# Patient Record
Sex: FEMALE | Race: BLACK OR AFRICAN AMERICAN | NOT HISPANIC OR LATINO | Employment: FULL TIME | URBAN - METROPOLITAN AREA
[De-identification: names, ages, dates, MRNs, and addresses within clinical notes are randomized per-mention and may not be internally consistent; named-entity substitution may affect disease eponyms.]

---

## 2018-05-10 ENCOUNTER — HOSPITAL ENCOUNTER (EMERGENCY)
Facility: HOSPITAL | Age: 28
Discharge: HOME | End: 2018-05-10
Attending: EMERGENCY MEDICINE
Payer: COMMERCIAL

## 2018-05-10 VITALS
RESPIRATION RATE: 12 BRPM | SYSTOLIC BLOOD PRESSURE: 137 MMHG | HEART RATE: 84 BPM | DIASTOLIC BLOOD PRESSURE: 90 MMHG | HEIGHT: 65 IN | OXYGEN SATURATION: 100 % | BODY MASS INDEX: 36.82 KG/M2 | TEMPERATURE: 99 F | WEIGHT: 221 LBS

## 2018-05-10 DIAGNOSIS — R03.0 ELEVATED BLOOD PRESSURE READING WITHOUT DIAGNOSIS OF HYPERTENSION: ICD-10-CM

## 2018-05-10 DIAGNOSIS — K80.50 BILIARY COLIC: ICD-10-CM

## 2018-05-10 DIAGNOSIS — K59.00 CONSTIPATION, UNSPECIFIED CONSTIPATION TYPE: ICD-10-CM

## 2018-05-10 DIAGNOSIS — R10.11 ABDOMINAL PAIN, RUQ (RIGHT UPPER QUADRANT): Primary | ICD-10-CM

## 2018-05-10 DIAGNOSIS — K80.20 CALCULUS OF GALLBLADDER WITHOUT CHOLECYSTITIS WITHOUT OBSTRUCTION: ICD-10-CM

## 2018-05-10 LAB
ALBUMIN SERPL BCP-MCNC: 3.6 G/DL
ALP SERPL-CCNC: 64 U/L
ALT SERPL W/O P-5'-P-CCNC: 18 U/L
AMYLASE SERPL-CCNC: 59 U/L
ANION GAP SERPL CALC-SCNC: 9 MMOL/L
AST SERPL-CCNC: 17 U/L
B-HCG UR QL: NEGATIVE
BACTERIA #/AREA URNS AUTO: NORMAL /HPF
BASOPHILS # BLD AUTO: 0.02 K/UL
BASOPHILS NFR BLD: 0.3 %
BILIRUB SERPL-MCNC: 0.2 MG/DL
BILIRUB UR QL STRIP: NEGATIVE
BUN SERPL-MCNC: 10 MG/DL
CALCIUM SERPL-MCNC: 9.5 MG/DL
CHLORIDE SERPL-SCNC: 104 MMOL/L
CLARITY UR REFRACT.AUTO: CLEAR
CO2 SERPL-SCNC: 25 MMOL/L
COLOR UR AUTO: YELLOW
CREAT SERPL-MCNC: 0.8 MG/DL
DIFFERENTIAL METHOD: ABNORMAL
EOSINOPHIL # BLD AUTO: 0.1 K/UL
EOSINOPHIL NFR BLD: 0.8 %
ERYTHROCYTE [DISTWIDTH] IN BLOOD BY AUTOMATED COUNT: 12.3 %
EST. GFR  (AFRICAN AMERICAN): >60 ML/MIN/1.73 M^2
EST. GFR  (NON AFRICAN AMERICAN): >60 ML/MIN/1.73 M^2
GLUCOSE SERPL-MCNC: 95 MG/DL
GLUCOSE UR QL STRIP: NEGATIVE
HCT VFR BLD AUTO: 36.9 %
HGB BLD-MCNC: 12.4 G/DL
HGB UR QL STRIP: ABNORMAL
KETONES UR QL STRIP: NEGATIVE
LEUKOCYTE ESTERASE UR QL STRIP: NEGATIVE
LIPASE SERPL-CCNC: 25 U/L
LYMPHOCYTES # BLD AUTO: 3.9 K/UL
LYMPHOCYTES NFR BLD: 51.4 %
MCH RBC QN AUTO: 29 PG
MCHC RBC AUTO-ENTMCNC: 33.6 G/DL
MCV RBC AUTO: 86 FL
MICROSCOPIC COMMENT: NORMAL
MONOCYTES # BLD AUTO: 0.6 K/UL
MONOCYTES NFR BLD: 7.6 %
NEUTROPHILS # BLD AUTO: 3 K/UL
NEUTROPHILS NFR BLD: 39.6 %
NITRITE UR QL STRIP: NEGATIVE
PH UR STRIP: 6 [PH] (ref 5–8)
PLATELET # BLD AUTO: 300 K/UL
PMV BLD AUTO: 10.5 FL
POTASSIUM SERPL-SCNC: 4.1 MMOL/L
PROT SERPL-MCNC: 7.8 G/DL
PROT UR QL STRIP: NEGATIVE
RBC # BLD AUTO: 4.28 M/UL
RBC #/AREA URNS AUTO: 1 /HPF (ref 0–4)
SODIUM SERPL-SCNC: 138 MMOL/L
SP GR UR STRIP: >=1.03 (ref 1–1.03)
SQUAMOUS #/AREA URNS AUTO: 1 /HPF
URN SPEC COLLECT METH UR: ABNORMAL
UROBILINOGEN UR STRIP-ACNC: NEGATIVE EU/DL
WBC # BLD AUTO: 7.6 K/UL
WBC #/AREA URNS AUTO: 0 /HPF (ref 0–5)

## 2018-05-10 PROCEDURE — 81000 URINALYSIS NONAUTO W/SCOPE: CPT

## 2018-05-10 PROCEDURE — 83690 ASSAY OF LIPASE: CPT

## 2018-05-10 PROCEDURE — 80053 COMPREHEN METABOLIC PANEL: CPT

## 2018-05-10 PROCEDURE — 82150 ASSAY OF AMYLASE: CPT

## 2018-05-10 PROCEDURE — 85025 COMPLETE CBC W/AUTO DIFF WBC: CPT

## 2018-05-10 PROCEDURE — 99284 EMERGENCY DEPT VISIT MOD MDM: CPT

## 2018-05-10 PROCEDURE — 81025 URINE PREGNANCY TEST: CPT

## 2018-05-10 NOTE — ED NOTES
Patient verbally verified and Spelled Full Name and Date of Birth. C/O  intermittent  RUQ pain since Sunday evening, vomited x 1 after forcing herself to vomit, no vomiting since then, took laxative on Tuesday with good results, also took OTC meds for gas.  Wednesday felt better & was awakened at about 0300 with recurrent RUQ pain.  Pt denies food intolerances.  Pt rates pain at 8/10  LOC: The patient is awake, alert and aware of environment with an appropriate affect, the patient is oriented x 3 and speaking appropriately.  APPEARANCE: Patient resting comfortably and in no acute distress, patient is clean and well groomed, patient's clothing is properly fastened.  HEENT: Brief WNL  SKIN: Brief WNL.   MUSCULOSKELETAL: Brief WNL  RESPIRATORY:  Denies SOB, cough or cold, chest clear gwendolyn.  Increased pain while taking deep breath  CARDIAC: Brief WNL, denies chest pain  GASTRO: Tenderness to RUQ, denies nausea, vomiting or diarrhea  : Intermittent urinary frequency, denies burning or pain with urination  Peripheral Vasc: Brief WNL  NEURO: Brief WNL  PSYCH: Brief WNL

## 2018-05-10 NOTE — DISCHARGE INSTRUCTIONS
_____________________    As discussed, you have significant gallstones and mild to moderate constipation.  Use MiraLax or similar over-the-counter for constipation, and see the general surgeon as scheduled to arrange for gallbladder removal.  Follow a very low-fat diet until then.  Return as needed.    _____________________

## 2018-05-10 NOTE — ED NOTES
Assumed care of patient at this time.  Patient is awake and alert with no complaints.  Will continue to monitor.

## 2018-05-10 NOTE — ED PROVIDER NOTES
Encounter Date: 5/10/2018       History     Chief Complaint   Patient presents with    Abdominal Pain     c/o right upper quad painwith nausea and vomiting     Patient currently presents with complaint of abdominal pain.  Onset of this event was first noted SUN.  This is localized to the RUQ.  This discomfort is described as aching in nature.  There are associated changes in bowel habits with constipation noted since SUN.  There has not been associated emesis.  There are not associated urinary complaints.  This is not a recurring problem.  Patient denies fever.            Review of patient's allergies indicates:  No Known Allergies  Past Medical History:   Diagnosis Date    Atrial fibrillation      Past Surgical History:   Procedure Laterality Date    TONSILLECTOMY       Family History   Problem Relation Age of Onset    Hypertension Mother     Hypertension Father      Social History   Substance Use Topics    Smoking status: Never Smoker    Smokeless tobacco: Never Used    Alcohol use Yes      Comment: social drinker     Review of Systems   Constitutional: Negative for chills and fever.   HENT: Negative for congestion and rhinorrhea.    Respiratory: Negative for cough, chest tightness, shortness of breath and wheezing.    Cardiovascular: Negative for chest pain, palpitations and leg swelling.   Gastrointestinal: Positive for abdominal pain. Negative for constipation, diarrhea, nausea and vomiting.   Genitourinary: Negative for dysuria, frequency, urgency, vaginal bleeding and vaginal discharge.   Skin: Negative for color change and rash.   Allergic/Immunologic: Negative for immunocompromised state.   Neurological: Negative for dizziness, weakness and numbness.   Hematological: Negative for adenopathy. Does not bruise/bleed easily.   All other systems reviewed and are negative.      Physical Exam     Initial Vitals [05/10/18 0355]   BP Pulse Resp Temp SpO2   134/87 63 20 98 °F (36.7 °C) 99 %      MAP        102.67         Physical Exam    Nursing note and vitals reviewed.  Constitutional: She appears well-developed and well-nourished. She is not diaphoretic. No distress.   HENT:   Head: Normocephalic and atraumatic.   Right Ear: External ear normal.   Left Ear: External ear normal.   Nose: Nose normal.   Mouth/Throat: Oropharynx is clear and moist.   Eyes: Conjunctivae and EOM are normal. Pupils are equal, round, and reactive to light. No scleral icterus.   Neck: Neck supple. No tracheal deviation present. No JVD present.   Cardiovascular: Normal rate, regular rhythm, normal heart sounds and intact distal pulses. Exam reveals no gallop and no friction rub.    No murmur heard.  Pulmonary/Chest: Breath sounds normal. No respiratory distress. She has no wheezes. She has no rhonchi. She has no rales.   Abdominal: Soft. Bowel sounds are normal. She exhibits no distension. There is tenderness (Ruq - mild to mod).   Musculoskeletal: Normal range of motion. She exhibits no edema.   Neurological: She is alert and oriented to person, place, and time. She has normal strength. No cranial nerve deficit or sensory deficit.   Skin: Skin is warm and dry. No rash noted.   Psychiatric: She has a normal mood and affect. Her behavior is normal.         ED Course   Procedures  Labs Reviewed   CBC W/ AUTO DIFFERENTIAL - Abnormal; Notable for the following:        Result Value    Hematocrit 36.9 (*)     Lymph% 51.4 (*)     All other components within normal limits   URINALYSIS - Abnormal; Notable for the following:     Specific Gravity, UA >=1.030 (*)     Occult Blood UA 1+ (*)     All other components within normal limits   AMYLASE   COMPREHENSIVE METABOLIC PANEL   LIPASE   PREGNANCY TEST, URINE RAPID   URINALYSIS MICROSCOPIC        Imaging Results          US Abdomen Limited (Final result)  Result time 05/10/18 08:40:16    Final result by Perry Romeo III, MD (05/10/18 08:40:16)                 Impression:      Cholelithiasis without  evidence of acute cholecystitis.      Electronically signed by: Perry Romeo MD  Date:    05/10/2018  Time:    08:40             Narrative:    EXAMINATION:  US ABDOMEN LIMITED    CLINICAL HISTORY:  RUQ pain;    TECHNIQUE:  Limited ultrasound of the right upper quadrant of the abdomen (including pancreas, liver, gallbladder, common bile duct, and right kidney) was performed.    COMPARISON:  None.    FINDINGS:  Liver: Normal in size, measuring 15.6 cm. Homogeneous echotexture. No focal hepatic lesions.    Gallbladder: The gallbladder is completely filled with shadowing gallstones with associated wall echo shadow sign.  There is no evidence of acute cholecystitis.  No sonographic Forrest's sign.    Biliary system: The common duct is not dilated, measuring 4.8 mm.  No intrahepatic ductal dilatation.    Right kidney: Normal in size with no hydronephrosis, measuring 10.2 cm.    Miscellaneous: No upper abdominal ascites.                               X-Ray Abdomen Flat And Erect (Final result)  Result time 05/10/18 06:43:38    Final result by Lauri Saavedra MD (05/10/18 06:43:38)                 Impression:      Moderate constipation      Electronically signed by: Lauri Saavedra MD  Date:    05/10/2018  Time:    06:43             Narrative:    EXAMINATION:  XR ABDOMEN FLAT AND ERECT    CLINICAL HISTORY:  Abdominal Pain, RUQ; constipation;    TECHNIQUE:  Single AP View of the abdomen was performed.    COMPARISON:  None    FINDINGS:  Nonobstructive bowel gas pattern.  Moderate constipation.    No obvious free air on single view radiograph.  No portal venous gas.    No acute fracture.                                   Medical Decision Making:   ED Management:      Based on vital signs taken here in the emergency room today, the patient was additionally counseled regarding an elevated blood pressure concerning for pre-hypertension/hypertension.  Accordingly the patient has been advised to follow with the primary care  physician for reassessment and management as needed.      6:11 AM Assumed care; case reviewed, patient examined; rx & w/u as per Dr. Perez; await u/s.    9:09 AM Improved; counseled in detail re: gallstones, low fat diet; outpatient Gen Surg appointment arranged.                      Clinical Impression:   The primary encounter diagnosis was Abdominal pain, RUQ (right upper quadrant). Diagnoses of Elevated blood pressure reading without diagnosis of hypertension, Constipation, unspecified constipation type, Calculus of gallbladder without cholecystitis without obstruction, and Biliary colic were also pertinent to this visit.                           Perry Hyde MD  05/10/18 0940

## 2018-05-11 ENCOUNTER — OFFICE VISIT (OUTPATIENT)
Dept: SURGERY | Facility: CLINIC | Age: 28
End: 2018-05-11
Payer: COMMERCIAL

## 2018-05-11 VITALS
TEMPERATURE: 98 F | DIASTOLIC BLOOD PRESSURE: 80 MMHG | SYSTOLIC BLOOD PRESSURE: 125 MMHG | WEIGHT: 221 LBS | BODY MASS INDEX: 36.78 KG/M2 | HEART RATE: 70 BPM

## 2018-05-11 DIAGNOSIS — K80.50 BILIARY COLIC: Primary | ICD-10-CM

## 2018-05-11 PROCEDURE — 99204 OFFICE O/P NEW MOD 45 MIN: CPT | Mod: S$GLB,,, | Performed by: SURGERY

## 2018-05-11 PROCEDURE — 3008F BODY MASS INDEX DOCD: CPT | Mod: CPTII,S$GLB,, | Performed by: SURGERY

## 2018-05-11 PROCEDURE — 99999 PR PBB SHADOW E&M-EST. PATIENT-LVL IV: CPT | Mod: PBBFAC,,, | Performed by: SURGERY

## 2018-05-11 RX ORDER — SODIUM CHLORIDE 9 MG/ML
INJECTION, SOLUTION INTRAVENOUS CONTINUOUS
Status: CANCELLED | OUTPATIENT
Start: 2018-05-11

## 2018-05-11 NOTE — H&P
History & Physical    SUBJECTIVE:     History of Present Illness:  Patient is a 28 y.o. female presents with known right upper quadrant abdominal pain. Onset of symptoms was abrupt starting 1 day ago with gradually improving course since that time. Patient denies of fever and chill. Symptoms are aggravated by nausea and vomiting.  The condition worsens and recurs when she consumes fatty food.  She was in the emergency department at the [facility] in [place].  She was found to have a multiple gallstones and referred for surgical evaluation.    Chief Complaint   Patient presents with    Consult    Gall Bladder Problem       Review of patient's allergies indicates:  No Known Allergies    Current Outpatient Prescriptions   Medication Sig Dispense Refill    norgestimate-ethinyl estradiol (ORTHO TRI-CYCLEN LO) 0.18/0.215/0.25 mg-25 mcg (28) tablet Take 1 tablet by mouth once daily.       No current facility-administered medications for this visit.        Past Medical History:   Diagnosis Date    Atrial fibrillation      Past Surgical History:   Procedure Laterality Date    TONSILLECTOMY       Family History   Problem Relation Age of Onset    Hypertension Mother     Hypertension Father      Social History   Substance Use Topics    Smoking status: Never Smoker    Smokeless tobacco: Never Used    Alcohol use Yes      Comment: social drinker        Review of Systems:  Review of Systems   Constitutional: Positive for activity change and appetite change. Negative for chills and fever.   HENT: Negative for sore throat and trouble swallowing.    Eyes: Negative.    Respiratory: Negative for cough and shortness of breath.    Cardiovascular: Negative.    Gastrointestinal: Positive for abdominal pain and nausea. Negative for abdominal distention and vomiting.   Endocrine: Negative.    Genitourinary: Negative.    Musculoskeletal: Negative.    Skin: Negative.    Allergic/Immunologic: Negative.    Neurological:  Negative.    Hematological: Does not bruise/bleed easily.   Psychiatric/Behavioral: Negative.        OBJECTIVE:     Vital Signs (Most Recent)  Temp: 98.3 °F (36.8 °C) (05/11/18 0920)  Pulse: 70 (05/11/18 0920)  BP: 125/80 (05/11/18 0920)     100.2 kg (221 lb)     Physical Exam:  Physical Exam   Constitutional: She is oriented to person, place, and time. She appears well-developed and well-nourished.   HENT:   Head: Normocephalic.   Right Ear: External ear normal.   Left Ear: External ear normal.   Nose: Nose normal.   Eyes: Pupils are equal, round, and reactive to light. No scleral icterus.   Neck: Normal range of motion. Neck supple. No thyromegaly present.   Cardiovascular: Normal rate, regular rhythm and normal heart sounds.    No murmur heard.  Pulmonary/Chest: Effort normal and breath sounds normal.   Abdominal: Soft. Bowel sounds are normal. There is tenderness. There is no guarding.   Musculoskeletal: Normal range of motion.   Lymphadenopathy:     She has no cervical adenopathy.   Neurological: She is alert and oriented to person, place, and time.   Skin: Skin is warm and dry.       Laboratory  Lab Results   Component Value Date    WBC 7.60 05/10/2018    HGB 12.4 05/10/2018    HCT 36.9 (L) 05/10/2018     05/10/2018    CHOL 153 07/02/2015    TRIG 78 07/02/2015    HDL 57 07/02/2015    ALT 18 05/10/2018    AST 17 05/10/2018     05/10/2018    K 4.1 05/10/2018     05/10/2018    CREATININE 0.8 05/10/2018    BUN 10 05/10/2018    CO2 25 05/10/2018    TSH 1.433 03/05/2015             Diagnostic Results:  Labs: Reviewed  US: Reviewed    None    ASSESSMENT/PLAN:     Biliary colic.    PLAN:Plan     The recommendation is proceed with robotic-assisted laparoscopic cholecystectomy.  The risk and the benefit of the surgical procedure were fully addressed with the patient.  The surgery scheduled for May 21, 2018 at 11:30 AM.    Collin Roblero

## 2018-05-16 ENCOUNTER — TELEPHONE (OUTPATIENT)
Dept: SURGERY | Facility: CLINIC | Age: 28
End: 2018-05-16

## 2018-05-16 ENCOUNTER — DOCUMENTATION ONLY (OUTPATIENT)
Dept: SURGERY | Facility: CLINIC | Age: 28
End: 2018-05-16

## 2018-05-16 NOTE — PROGRESS NOTES
"Received message IM from Letty Mahan/Pre-Admit states,"pt decided to let someone else do her surgery." Scarlett/Nurse [facility], and Dr Roblero was notified of this matter.  "